# Patient Record
Sex: MALE | Race: BLACK OR AFRICAN AMERICAN | NOT HISPANIC OR LATINO | Employment: FULL TIME | URBAN - METROPOLITAN AREA
[De-identification: names, ages, dates, MRNs, and addresses within clinical notes are randomized per-mention and may not be internally consistent; named-entity substitution may affect disease eponyms.]

---

## 2023-10-27 ENCOUNTER — OFFICE VISIT (OUTPATIENT)
Dept: FAMILY MEDICINE CLINIC | Facility: CLINIC | Age: 38
End: 2023-10-27
Payer: COMMERCIAL

## 2023-10-27 ENCOUNTER — LAB (OUTPATIENT)
Dept: LAB | Facility: HOSPITAL | Age: 38
End: 2023-10-27
Payer: COMMERCIAL

## 2023-10-27 VITALS
HEIGHT: 71 IN | DIASTOLIC BLOOD PRESSURE: 90 MMHG | WEIGHT: 261.2 LBS | SYSTOLIC BLOOD PRESSURE: 132 MMHG | OXYGEN SATURATION: 97 % | HEART RATE: 93 BPM | BODY MASS INDEX: 36.57 KG/M2

## 2023-10-27 DIAGNOSIS — Z11.59 ENCOUNTER FOR HEPATITIS C SCREENING TEST FOR LOW RISK PATIENT: ICD-10-CM

## 2023-10-27 DIAGNOSIS — I10 PRIMARY HYPERTENSION: Primary | ICD-10-CM

## 2023-10-27 DIAGNOSIS — K21.9 GASTROESOPHAGEAL REFLUX DISEASE WITHOUT ESOPHAGITIS: ICD-10-CM

## 2023-10-27 DIAGNOSIS — Z23 IMMUNIZATION DUE: ICD-10-CM

## 2023-10-27 DIAGNOSIS — I10 PRIMARY HYPERTENSION: ICD-10-CM

## 2023-10-27 LAB
ALBUMIN SERPL-MCNC: 4.5 G/DL (ref 3.5–5.2)
ALBUMIN/GLOB SERPL: 1.5 G/DL
ALP SERPL-CCNC: 97 U/L (ref 39–117)
ALT SERPL W P-5'-P-CCNC: 25 U/L (ref 1–41)
ANION GAP SERPL CALCULATED.3IONS-SCNC: 10.4 MMOL/L (ref 5–15)
AST SERPL-CCNC: 31 U/L (ref 1–40)
BILIRUB SERPL-MCNC: 0.3 MG/DL (ref 0–1.2)
BUN SERPL-MCNC: 18 MG/DL (ref 6–20)
BUN/CREAT SERPL: 17.6 (ref 7–25)
CALCIUM SPEC-SCNC: 9.6 MG/DL (ref 8.6–10.5)
CHLORIDE SERPL-SCNC: 102 MMOL/L (ref 98–107)
CHOLEST SERPL-MCNC: 199 MG/DL (ref 0–200)
CO2 SERPL-SCNC: 25.6 MMOL/L (ref 22–29)
CREAT SERPL-MCNC: 1.02 MG/DL (ref 0.76–1.27)
DEPRECATED RDW RBC AUTO: 42.8 FL (ref 37–54)
EGFRCR SERPLBLD CKD-EPI 2021: 97.7 ML/MIN/1.73
ERYTHROCYTE [DISTWIDTH] IN BLOOD BY AUTOMATED COUNT: 12.5 % (ref 12.3–15.4)
GLOBULIN UR ELPH-MCNC: 3.1 GM/DL
GLUCOSE SERPL-MCNC: 96 MG/DL (ref 65–99)
HCT VFR BLD AUTO: 41.2 % (ref 37.5–51)
HCV AB SER DONR QL: NORMAL
HDLC SERPL-MCNC: 56 MG/DL (ref 40–60)
HGB BLD-MCNC: 14.2 G/DL (ref 13–17.7)
LDLC SERPL CALC-MCNC: 115 MG/DL (ref 0–100)
LDLC/HDLC SERPL: 1.98 {RATIO}
MCH RBC QN AUTO: 32.3 PG (ref 26.6–33)
MCHC RBC AUTO-ENTMCNC: 34.5 G/DL (ref 31.5–35.7)
MCV RBC AUTO: 93.6 FL (ref 79–97)
PLATELET # BLD AUTO: 343 10*3/MM3 (ref 140–450)
PMV BLD AUTO: 9.9 FL (ref 6–12)
POTASSIUM SERPL-SCNC: 4.1 MMOL/L (ref 3.5–5.2)
PROT SERPL-MCNC: 7.6 G/DL (ref 6–8.5)
RBC # BLD AUTO: 4.4 10*6/MM3 (ref 4.14–5.8)
SODIUM SERPL-SCNC: 138 MMOL/L (ref 136–145)
TRIGL SERPL-MCNC: 161 MG/DL (ref 0–150)
TSH SERPL DL<=0.05 MIU/L-ACNC: 1.39 UIU/ML (ref 0.27–4.2)
VLDLC SERPL-MCNC: 28 MG/DL (ref 5–40)
WBC NRBC COR # BLD: 5.74 10*3/MM3 (ref 3.4–10.8)

## 2023-10-27 PROCEDURE — 82607 VITAMIN B-12: CPT

## 2023-10-27 PROCEDURE — 86803 HEPATITIS C AB TEST: CPT

## 2023-10-27 PROCEDURE — 80061 LIPID PANEL: CPT

## 2023-10-27 PROCEDURE — 80050 GENERAL HEALTH PANEL: CPT

## 2023-10-27 RX ORDER — HYDROCHLOROTHIAZIDE 25 MG/1
25 TABLET ORAL DAILY
Qty: 90 TABLET | Refills: 3 | Status: SHIPPED | OUTPATIENT
Start: 2023-10-27

## 2023-10-27 RX ORDER — PANTOPRAZOLE SODIUM 40 MG/1
40 TABLET, DELAYED RELEASE ORAL DAILY
Qty: 90 TABLET | Refills: 3 | Status: SHIPPED | OUTPATIENT
Start: 2023-10-27

## 2023-10-27 RX ORDER — AMLODIPINE BESYLATE 10 MG/1
10 TABLET ORAL DAILY
Qty: 90 TABLET | Refills: 3 | Status: SHIPPED | OUTPATIENT
Start: 2023-10-27

## 2023-10-27 NOTE — PROGRESS NOTES
New Patient Office Visit      Date: 10/27/2023   Patient Name: Gavino Lamas  : 1985   MRN: [id number]     Chief Complaint:    Chief Complaint   Patient presents with    New patient preventative medicine services     Establish care    Med Refill     Pt needs refills on all of his medications.       History of Present Illness: Gavino Lamas is a 38 y.o. male who is here today to establish care.  He lives at home with his son.  He currently works for [employer] with the [employer].    Patient presents today to follow-up on chronic hypertension.  He is currently prescribed amlodipine and hydrochlorothiazide and is doing well with these medications.  Is been sometime since he had any screening lab work.  Patient also has a history of GERD.  He is currently taking Protonix and doing well with this medication.  Patient reports no strong family history of cancer.  He is interested in a flu vaccine today.      Subjective      Review of Systems:   Review of Systems   All other systems reviewed and are negative.      Past Medical History:   Past Medical History:   Diagnosis Date    Heartburn     Hypertension        Past Surgical History: History reviewed. No pertinent surgical history.    Family History: History reviewed. No pertinent family history.    Social History:   Social History     Socioeconomic History    Marital status: Single   Tobacco Use    Smoking status: Never    Smokeless tobacco: Never   Vaping Use    Vaping Use: Never used   Substance and Sexual Activity    Alcohol use: Yes    Drug use: Defer    Sexual activity: Defer       Medications:     Current Outpatient Medications:     amLODIPine (NORVASC) 10 MG tablet, Take 1 tablet by mouth Daily., Disp: 90 tablet, Rfl: 3    hydroCHLOROthiazide (HYDRODIURIL) 25 MG tablet, Take 1 tablet by mouth Daily., Disp: 90 tablet, Rfl: 3    pantoprazole (PROTONIX) 40 MG EC tablet, Take 1 tablet by mouth Daily., Disp: 90 tablet, Rfl: 3    Allergies:   Allergies  "  Allergen Reactions    Lisinopril Swelling       Objective     Physical Exam:  Vital Signs:   Vitals:    10/27/23 0759   BP: 132/90   Pulse: 93   SpO2: 97%   Weight: 118 kg (261 lb 3.2 oz)   Height: 180.3 cm (71\")     Body mass index is 36.43 kg/m².  Class 2 Severe Obesity (BMI >=35 and <=39.9). Obesity-related health conditions include the following: hypertension and GERD. Obesity is unchanged. BMI is is above average; BMI management plan is completed. We discussed low calorie, low carb based diet program, portion control, and increasing exercise.       Physical Exam  Vitals and nursing note reviewed.   Constitutional:       Appearance: Normal appearance. He is normal weight.   HENT:      Head: Normocephalic and atraumatic.      Nose: Nose normal.      Mouth/Throat:      Mouth: Mucous membranes are moist.   Eyes:      Extraocular Movements: Extraocular movements intact.      Pupils: Pupils are equal, round, and reactive to light.   Cardiovascular:      Rate and Rhythm: Normal rate and regular rhythm.   Pulmonary:      Effort: Pulmonary effort is normal.      Breath sounds: Normal breath sounds.   Abdominal:      General: Abdomen is flat. Bowel sounds are normal.      Palpations: Abdomen is soft.   Musculoskeletal:         General: Normal range of motion.      Cervical back: Normal range of motion.   Skin:     General: Skin is warm and dry.   Neurological:      General: No focal deficit present.      Mental Status: He is alert.   Psychiatric:         Mood and Affect: Mood normal.         Behavior: Behavior normal.         Thought Content: Thought content normal.         Judgment: Judgment normal.         Procedures    Results:   PHQ-9 Total Score: 0            Assessment / Plan      Assessment/Plan:   Diagnoses and all orders for this visit:    1. Primary hypertension (Primary)  Assessment & Plan:  Hypertension is improving with treatment.  Continue current treatment regimen.  Dietary sodium restriction.  Weight " loss.  Regular aerobic exercise.  Continue current medications.  Blood pressure will be reassessed at the next regular appointment.    Orders:  -     CBC (No Diff); Future  -     Comprehensive Metabolic Panel; Future  -     Lipid Panel; Future  -     TSH; Future  -     amLODIPine (NORVASC) 10 MG tablet; Take 1 tablet by mouth Daily.  Dispense: 90 tablet; Refill: 3  -     hydroCHLOROthiazide (HYDRODIURIL) 25 MG tablet; Take 1 tablet by mouth Daily.  Dispense: 90 tablet; Refill: 3    2. Immunization due  Assessment & Plan:  Flu vaccine given today    Orders:  -     Fluzone >6 Months (0307-5565)    3. Encounter for hepatitis C screening test for low risk patient  Assessment & Plan:  Screening labs completed today    Orders:  -     Hepatitis C antibody; Future    4. Gastroesophageal reflux disease without esophagitis  Assessment & Plan:  Continue PPI check labs today    Orders:  -     pantoprazole (PROTONIX) 40 MG EC tablet; Take 1 tablet by mouth Daily.  Dispense: 90 tablet; Refill: 3  -     Vitamin B12; Future         Follow Up:   Return in about 1 year (around 10/27/2024) for Annual.    Stephanie Garcia DO   [facility] [place] [facility]

## 2023-10-28 ENCOUNTER — TELEPHONE (OUTPATIENT)
Dept: FAMILY MEDICINE CLINIC | Facility: CLINIC | Age: 38
End: 2023-10-28
Payer: COMMERCIAL

## 2023-10-28 LAB — VIT B12 BLD-MCNC: 477 PG/ML (ref 211–946)

## 2023-10-28 NOTE — TELEPHONE ENCOUNTER
----- Message from Stephanie Garcia DO sent at 10/27/2023  3:50 PM EDT -----  I reviewed your lab results.  Your LDL was mildly elevated at 115 and your triglycerides were mildly elevated at 160. Your LDL cholesterol worsened, which if persistent would put you at increased risk for heart disease and stroke. To help reverse this trend, here are some recommendations: Engage in aerobic physical activity to lower blood pressure and reduce LDL and non-HDL cholesterol.  I would advise at least 3-4 sessions per week, lasting on average 40 minutes per session, involving moderate to vigorous intensity physical activity. 2.  For the benefit of both blood pressure and LDL lowering, consume a diet emphasizing the intake of vegetables, fruits, and whole grains.  It should include low-fat dairy products, poultry, fish, legumes, nontropical vegetable oils, and nuts.  Limit your intake of sweets, sugar sweetened beverages, and red meats.  I would repeat lipid panel in 1 year to ensure it is trending down.    Attempted to call no answer

## 2023-11-02 ENCOUNTER — PATIENT ROUNDING (BHMG ONLY) (OUTPATIENT)
Dept: FAMILY MEDICINE CLINIC | Facility: CLINIC | Age: 38
End: 2023-11-02
Payer: COMMERCIAL

## 2023-11-09 DIAGNOSIS — K21.9 GASTROESOPHAGEAL REFLUX DISEASE WITHOUT ESOPHAGITIS: ICD-10-CM

## 2023-11-09 RX ORDER — PANTOPRAZOLE SODIUM 40 MG/1
40 TABLET, DELAYED RELEASE ORAL DAILY
Qty: 90 TABLET | Refills: 3 | Status: SHIPPED | OUTPATIENT
Start: 2023-11-09

## 2023-11-09 NOTE — TELEPHONE ENCOUNTER
Rx Refill Note  Requested Prescriptions     Pending Prescriptions Disp Refills    pantoprazole (PROTONIX) 40 MG EC tablet 90 tablet 3     Sig: Take 1 tablet by mouth Daily.      Last office visit with prescribing clinician: 10/27/2023     Next office visit with prescribing clinician: 10/29/2024       Raven Jimenes MA  11/09/23, 10:43 EST

## 2024-03-28 ENCOUNTER — OFFICE VISIT (OUTPATIENT)
Dept: FAMILY MEDICINE CLINIC | Facility: CLINIC | Age: 39
End: 2024-03-28
Payer: COMMERCIAL

## 2024-03-28 VITALS
HEART RATE: 90 BPM | OXYGEN SATURATION: 98 % | WEIGHT: 275 LBS | HEIGHT: 71 IN | SYSTOLIC BLOOD PRESSURE: 136 MMHG | BODY MASS INDEX: 38.5 KG/M2 | DIASTOLIC BLOOD PRESSURE: 98 MMHG

## 2024-03-28 DIAGNOSIS — K21.9 GASTROESOPHAGEAL REFLUX DISEASE WITHOUT ESOPHAGITIS: ICD-10-CM

## 2024-03-28 DIAGNOSIS — I10 PRIMARY HYPERTENSION: ICD-10-CM

## 2024-03-28 DIAGNOSIS — J06.9 VIRAL URI WITH COUGH: ICD-10-CM

## 2024-03-28 DIAGNOSIS — E66.01 CLASS 2 SEVERE OBESITY DUE TO EXCESS CALORIES WITH SERIOUS COMORBIDITY AND BODY MASS INDEX (BMI) OF 38.0 TO 38.9 IN ADULT: Primary | ICD-10-CM

## 2024-03-28 PROBLEM — E66.812 CLASS 2 SEVERE OBESITY DUE TO EXCESS CALORIES WITH SERIOUS COMORBIDITY AND BODY MASS INDEX (BMI) OF 38.0 TO 38.9 IN ADULT: Status: ACTIVE | Noted: 2024-03-28

## 2024-03-28 LAB
EXPIRATION DATE: NORMAL
FLUAV AG UPPER RESP QL IA.RAPID: NOT DETECTED
FLUBV AG UPPER RESP QL IA.RAPID: NOT DETECTED
INTERNAL CONTROL: NORMAL
Lab: NORMAL
SARS-COV-2 AG UPPER RESP QL IA.RAPID: NOT DETECTED

## 2024-03-28 PROCEDURE — 87428 SARSCOV & INF VIR A&B AG IA: CPT | Performed by: FAMILY MEDICINE

## 2024-03-28 PROCEDURE — 99214 OFFICE O/P EST MOD 30 MIN: CPT | Performed by: FAMILY MEDICINE

## 2024-03-28 NOTE — ASSESSMENT & PLAN NOTE
Discussed the probable etiology of viral URI. Low suspicion for bacterial etiology. Recommended supportive care, adequate hydration, and stressed the importance of healthy hygiene habits to avoid spread (I.e. hand washing, social distancing) Advised patient to call clinic if they develop fever or if symptoms worsen/persist.

## 2024-03-28 NOTE — ASSESSMENT & PLAN NOTE
Hypertension is stable and controlled  Continue current treatment regimen.  Weight loss.  Regular aerobic exercise.  Ambulatory blood pressure monitoring.  Blood pressure will be reassessed in 3 months.

## 2024-03-28 NOTE — PROGRESS NOTES
Answers submitted by the patient for this visit:  Primary Reason for Visit (Submitted on 3/26/2024)  What is the primary reason for your visit?: Other  Other (Submitted on 3/26/2024)  Please describe your symptoms.: To discuss wegovy  Have you had these symptoms before?: No  How long have you been having these symptoms?: Greater than 2 weeks      Office Note     Name: Gavino Lamas    : 1985     MRN: [id number]     Chief Complaint  Weight Loss (Wants to discuss wegovy ), Nasal Congestion, and Cough (Symptoms have been ongoing for 2 weeks)    Subjective     History of Present Illness:  Gavino Lamas is a 38 y.o. male who presents today for evaluation of upper respiratory infection    Patient with today for evaluation of [place] infection.  He reports has been going on for 2 weeks.  He notes that he has nasal congestion the drain to his chest and causes a cough.  He denies any fevers or ear pain.    Patient has a diagnosis of hypertension and GERD and is currently well-controlled with medication.  Patient's blood pressure did improve on recheck.    Patient is also here today to discuss weight loss.  He reports over the past 12 months he has tried a variety of diet and exercise modifications with limited success.  He has not done calorie restriction, increased protein and increased fiber.  Patient managed in trying medication to help with weight loss.    Review of Systems:   Review of Systems   Constitutional:  Positive for unexpected weight gain.   All other systems reviewed and are negative.      Past Medical History:   Past Medical History:   Diagnosis Date    Heartburn     Hypertension        Past Surgical History: History reviewed. No pertinent surgical history.    Family History: History reviewed. No pertinent family history.    Social History:   Social History     Socioeconomic History    Marital status: Single   Tobacco Use    Smoking status: Former     Current packs/day: 0.00     Average packs/day:  "0.5 packs/day for 17.7 years (8.8 ttl pk-yrs)     Types: Cigars, Cigarettes     Start date: 2005     Quit date: 2023     Years since quittin.2    Smokeless tobacco: Never   Vaping Use    Vaping status: Never Used   Substance and Sexual Activity    Alcohol use: Yes     Comment: Socially/weekend    Drug use: Never    Sexual activity: Yes     Partners: Female     Birth control/protection: Condom       Immunizations:   Immunization History   Administered Date(s) Administered    31-influenza Vac Quardvalent Preservativ 2020    COVID-19 (MODERNA) 1st,2nd,3rd Dose Monovalent 2021, 2021    COVID-19 (MODERNA) Monovalent Original Booster 2021    Fluzone (or Fluarix & Flulaval for VFC) >6mos 2023, 10/27/2023    Hepatitis A 2023    Influenza Seasonal Injectable 2019    Tdap 2023        Medications:     Current Outpatient Medications:     amLODIPine (NORVASC) 10 MG tablet, Take 1 tablet by mouth Daily., Disp: 90 tablet, Rfl: 3    hydroCHLOROthiazide (HYDRODIURIL) 25 MG tablet, Take 1 tablet by mouth Daily., Disp: 90 tablet, Rfl: 3    pantoprazole (PROTONIX) 40 MG EC tablet, Take 1 tablet by mouth Daily., Disp: 90 tablet, Rfl: 3    Semaglutide-Weight Management 0.25 MG/0.5ML solution auto-injector, Inject 0.5 mL under the skin into the appropriate area as directed 1 (One) Time Per Week for 28 days., Disp: 2 mL, Rfl: 0    Allergies:   Allergies   Allergen Reactions    Lisinopril Swelling       Objective     Vital Signs  /98   Pulse 90   Ht 180.3 cm (70.98\")   Wt 125 kg (275 lb)   SpO2 98%   BMI 38.37 kg/m²   Estimated body mass index is 38.37 kg/m² as calculated from the following:    Height as of this encounter: 180.3 cm (70.98\").    Weight as of this encounter: 125 kg (275 lb).    Class 2 Severe Obesity (BMI >=35 and <=39.9). Obesity-related health conditions include the following: hypertension and GERD. Obesity is worsening. BMI is is above average; BMI " management plan is completed. We discussed portion control, increasing exercise, and pharmacologic options including Wegovy .       Physical Exam  Vitals and nursing note reviewed.   Constitutional:       Appearance: Normal appearance. He is obese.   HENT:      Head: Normocephalic and atraumatic.      Nose: Nose normal.      Mouth/Throat:      Mouth: Mucous membranes are moist.   Eyes:      Extraocular Movements: Extraocular movements intact.      Pupils: Pupils are equal, round, and reactive to light.   Cardiovascular:      Rate and Rhythm: Normal rate and regular rhythm.   Pulmonary:      Effort: Pulmonary effort is normal.      Breath sounds: Normal breath sounds.   Abdominal:      General: Abdomen is flat. Bowel sounds are normal.      Palpations: Abdomen is soft.   Musculoskeletal:         General: Normal range of motion.      Cervical back: Normal range of motion.   Skin:     General: Skin is warm and dry.   Neurological:      General: No focal deficit present.      Mental Status: He is alert.   Psychiatric:         Mood and Affect: Mood normal.         Behavior: Behavior normal.         Thought Content: Thought content normal.         Judgment: Judgment normal.            Procedures     Results:  Recent Results (from the past 24 hour(s))   POCT SARS-CoV-2 Antigen MARGO + Flu    Collection Time: 03/28/24  4:28 PM    Specimen: Swab   Result Value Ref Range    SARS Antigen Not Detected Not Detected, Presumptive Negative    Influenza A Antigen MARGO Not Detected Not Detected    Influenza B Antigen MARGO Not Detected Not Detected    Internal Control Passed Passed    Lot Number [id number]     Expiration Date 02/05/2025         Assessment and Plan     Assessment/Plan:  Diagnoses and all orders for this visit:    1. Class 2 severe obesity due to excess calories with serious comorbidity and body mass index (BMI) of 38.0 to 38.9 in adult (Primary)  Assessment & Plan:  Patient's (Body mass index is 38.37 kg/m².) indicates that  they are morbidly/severely obese (BMI > 40 or > 35 with obesity - related health condition) with health conditions that include hypertension and GERD . Weight is worsening. BMI  is above average; BMI management plan is completed. We discussed portion control, increasing exercise, pharmacologic options including Wegovy, and Information on healthy weight added to patient's after visit summary.     Orders:  -     POCT SARS-CoV-2 Antigen MARGO + Flu  -     Semaglutide-Weight Management 0.25 MG/0.5ML solution auto-injector; Inject 0.5 mL under the skin into the appropriate area as directed 1 (One) Time Per Week for 28 days.  Dispense: 2 mL; Refill: 0    2. Primary hypertension  Assessment & Plan:  Hypertension is stable and controlled  Continue current treatment regimen.  Weight loss.  Regular aerobic exercise.  Ambulatory blood pressure monitoring.  Blood pressure will be reassessed in 3 months.      3. Gastroesophageal reflux disease without esophagitis  Assessment & Plan:  Continue PPI      4. Viral URI with cough  Assessment & Plan:  Discussed the probable etiology of viral URI. Low suspicion for bacterial etiology. Recommended supportive care, adequate hydration, and stressed the importance of healthy hygiene habits to avoid spread (I.e. hand washing, social distancing) Advised patient to call clinic if they develop fever or if symptoms worsen/persist.            Follow Up  Return in about 3 months (around 6/28/2024) for Recheck.    Stephanie Garcia DO   [facility]

## 2024-03-28 NOTE — ASSESSMENT & PLAN NOTE
Patient's (Body mass index is 38.37 kg/m².) indicates that they are morbidly/severely obese (BMI > 40 or > 35 with obesity - related health condition) with health conditions that include hypertension and GERD . Weight is worsening. BMI  is above average; BMI management plan is completed. We discussed portion control, increasing exercise, pharmacologic options including Wegovy, and Information on healthy weight added to patient's after visit summary.

## 2024-04-15 DIAGNOSIS — E66.01 CLASS 2 SEVERE OBESITY DUE TO EXCESS CALORIES WITH SERIOUS COMORBIDITY AND BODY MASS INDEX (BMI) OF 38.0 TO 38.9 IN ADULT: ICD-10-CM

## 2024-04-17 RX ORDER — SEMAGLUTIDE 0.25 MG/.5ML
INJECTION, SOLUTION SUBCUTANEOUS
Qty: 2 ML | Refills: 0 | Status: SHIPPED | OUTPATIENT
Start: 2024-04-17

## 2024-05-09 DIAGNOSIS — E66.01 CLASS 2 SEVERE OBESITY DUE TO EXCESS CALORIES WITH SERIOUS COMORBIDITY AND BODY MASS INDEX (BMI) OF 38.0 TO 38.9 IN ADULT: ICD-10-CM

## 2024-05-09 RX ORDER — SEMAGLUTIDE 0.25 MG/.5ML
INJECTION, SOLUTION SUBCUTANEOUS
Qty: 2 ML | Refills: 0 | Status: SHIPPED | OUTPATIENT
Start: 2024-05-09 | End: 2024-05-09

## 2024-05-10 ENCOUNTER — PRIOR AUTHORIZATION (OUTPATIENT)
Dept: FAMILY MEDICINE CLINIC | Facility: CLINIC | Age: 39
End: 2024-05-10
Payer: COMMERCIAL

## 2024-10-29 ENCOUNTER — OFFICE VISIT (OUTPATIENT)
Dept: FAMILY MEDICINE CLINIC | Facility: CLINIC | Age: 39
End: 2024-10-29
Payer: COMMERCIAL

## 2024-10-29 VITALS
HEIGHT: 71 IN | TEMPERATURE: 98 F | BODY MASS INDEX: 37.77 KG/M2 | SYSTOLIC BLOOD PRESSURE: 154 MMHG | OXYGEN SATURATION: 95 % | HEART RATE: 94 BPM | WEIGHT: 269.8 LBS | DIASTOLIC BLOOD PRESSURE: 109 MMHG

## 2024-10-29 DIAGNOSIS — E66.01 CLASS 2 SEVERE OBESITY DUE TO EXCESS CALORIES WITH SERIOUS COMORBIDITY AND BODY MASS INDEX (BMI) OF 37.0 TO 37.9 IN ADULT: ICD-10-CM

## 2024-10-29 DIAGNOSIS — J40 BRONCHITIS: Primary | ICD-10-CM

## 2024-10-29 DIAGNOSIS — K21.9 GASTROESOPHAGEAL REFLUX DISEASE WITHOUT ESOPHAGITIS: ICD-10-CM

## 2024-10-29 DIAGNOSIS — E66.812 CLASS 2 SEVERE OBESITY DUE TO EXCESS CALORIES WITH SERIOUS COMORBIDITY AND BODY MASS INDEX (BMI) OF 37.0 TO 37.9 IN ADULT: ICD-10-CM

## 2024-10-29 DIAGNOSIS — I10 PRIMARY HYPERTENSION: ICD-10-CM

## 2024-10-29 LAB
EXPIRATION DATE: NORMAL
EXPIRATION DATE: NORMAL
FLUAV AG UPPER RESP QL IA.RAPID: NOT DETECTED
FLUBV AG UPPER RESP QL IA.RAPID: NOT DETECTED
INTERNAL CONTROL: NORMAL
INTERNAL CONTROL: NORMAL
Lab: NORMAL
Lab: NORMAL
S PYO AG THROAT QL: NEGATIVE
SARS-COV-2 AG UPPER RESP QL IA.RAPID: NOT DETECTED

## 2024-10-29 PROCEDURE — 87880 STREP A ASSAY W/OPTIC: CPT | Performed by: FAMILY MEDICINE

## 2024-10-29 PROCEDURE — 99214 OFFICE O/P EST MOD 30 MIN: CPT | Performed by: FAMILY MEDICINE

## 2024-10-29 PROCEDURE — 87428 SARSCOV & INF VIR A&B AG IA: CPT | Performed by: FAMILY MEDICINE

## 2024-10-29 RX ORDER — PANTOPRAZOLE SODIUM 40 MG/1
40 TABLET, DELAYED RELEASE ORAL DAILY
Qty: 90 TABLET | Refills: 3 | Status: SHIPPED | OUTPATIENT
Start: 2024-10-29

## 2024-10-29 RX ORDER — AZITHROMYCIN 250 MG/1
TABLET, FILM COATED ORAL
Qty: 6 TABLET | Refills: 0 | Status: SHIPPED | OUTPATIENT
Start: 2024-10-29 | End: 2024-11-02

## 2024-10-29 RX ORDER — HYDROCHLOROTHIAZIDE 25 MG/1
25 TABLET ORAL DAILY
Qty: 90 TABLET | Refills: 3 | Status: SHIPPED | OUTPATIENT
Start: 2024-10-29

## 2024-10-29 RX ORDER — AMLODIPINE BESYLATE 10 MG/1
10 TABLET ORAL DAILY
Qty: 90 TABLET | Refills: 3 | Status: SHIPPED | OUTPATIENT
Start: 2024-10-29

## 2024-10-29 NOTE — PROGRESS NOTES
Office Note     Name: Gavino Lamas    : 1985     MRN: [id number]     Chief Complaint  Cough (All symptoms ongoing for a week ), Nasal Congestion, and Sore Throat    Subjective     History of Present Illness:  Gavino Lamas is a 39 y.o. male who presents today for URI.    Patient has the past week he has been having a cough, nasal congestion, and sore throat.  He is little hoarse.  He denies any fevers or GI symptoms.  He denies any sick contacts at home.  He notes he has been using Mucinex and cough drops over-the-counter with minimal relief.    Patient is also requesting a refill on his maintenance medications.  He is taking hydrochlorothiazide LMP and and doing well with this regimen.  He is also taking PPI for GERD and doing well.      Review of Systems:   Review of Systems   HENT:  Positive for congestion.    Respiratory:  Positive for cough.    All other systems reviewed and are negative.      Past Medical History:   Past Medical History:   Diagnosis Date    Heartburn     Hypertension        Past Surgical History: History reviewed. No pertinent surgical history.    Family History: History reviewed. No pertinent family history.    Social History:   Social History     Socioeconomic History    Marital status: Single   Tobacco Use    Smoking status: Former     Current packs/day: 0.00     Average packs/day: 0.5 packs/day for 17.7 years (8.8 ttl pk-yrs)     Types: Cigars, Cigarettes     Start date: 2005     Quit date: 2023     Years since quittin.8    Smokeless tobacco: Never   Vaping Use    Vaping status: Never Used   Substance and Sexual Activity    Alcohol use: Yes     Comment: Socially/weekend    Drug use: Never    Sexual activity: Yes     Partners: Female     Birth control/protection: Condom       Immunizations:   Immunization History   Administered Date(s) Administered    31-influenza Vac Quardvalent Preservativ 2020    COVID-19 (MODERNA) 1st,2nd,3rd Dose Monovalent 2021,  "05/21/2021    COVID-19 (MODERNA) Monovalent Original Booster 12/23/2021    Fluzone (or Fluarix & Flulaval for VFC) >6mos 01/03/2023, 10/27/2023    Hepatitis A 01/03/2023    Influenza Seasonal Injectable 11/05/2019    Tdap 01/03/2023        Medications:     Current Outpatient Medications:     amLODIPine (NORVASC) 10 MG tablet, Take 1 tablet by mouth Daily., Disp: 90 tablet, Rfl: 3    hydroCHLOROthiazide 25 MG tablet, Take 1 tablet by mouth Daily., Disp: 90 tablet, Rfl: 3    pantoprazole (PROTONIX) 40 MG EC tablet, Take 1 tablet by mouth Daily., Disp: 90 tablet, Rfl: 3    azithromycin (Zithromax Z-Elvis) 250 MG tablet, Take 2 tablets by mouth Daily for 1 day, THEN 1 tablet Daily for 4 days. Take 2 tablets the first day, then 1 tablet daily for 4 days., Disp: 6 tablet, Rfl: 0    Allergies:   Allergies   Allergen Reactions    Lisinopril Swelling       Objective     Vital Signs  BP (!) 154/109   Pulse 94   Temp 98 °F (36.7 °C)   Ht 180.3 cm (70.98\")   Wt 122 kg (269 lb 12.8 oz)   SpO2 95%   BMI 37.65 kg/m²   Estimated body mass index is 37.65 kg/m² as calculated from the following:    Height as of this encounter: 180.3 cm (70.98\").    Weight as of this encounter: 122 kg (269 lb 12.8 oz).       Physical Exam  Vitals and nursing note reviewed.   Constitutional:       Appearance: Normal appearance. He is obese.   HENT:      Head: Normocephalic and atraumatic.      Nose: Nose normal.      Mouth/Throat:      Mouth: Mucous membranes are moist.   Eyes:      Extraocular Movements: Extraocular movements intact.      Pupils: Pupils are equal, round, and reactive to light.   Cardiovascular:      Rate and Rhythm: Normal rate.   Pulmonary:      Effort: Pulmonary effort is normal.   Abdominal:      General: Abdomen is flat.   Musculoskeletal:         General: Normal range of motion.      Cervical back: Normal range of motion.   Skin:     General: Skin is warm and dry.   Neurological:      General: No focal deficit present.      " Mental Status: He is alert.   Psychiatric:         Mood and Affect: Mood normal.         Behavior: Behavior normal.         Thought Content: Thought content normal.         Judgment: Judgment normal.            Procedures     Results:  Recent Results (from the past 24 hours)   POCT SARS-CoV-2 Antigen MARGO + Flu    Collection Time: 10/29/24  9:19 AM    Specimen: Swab   Result Value Ref Range    SARS Antigen Not Detected Not Detected, Presumptive Negative    Influenza A Antigen MARGO Not Detected Not Detected    Influenza B Antigen MARGO Not Detected Not Detected    Internal Control Passed Passed    Lot Number [id number]     Expiration Date 9/4/2025    POCT rapid strep A    Collection Time: 10/29/24  9:20 AM    Specimen: Swab   Result Value Ref Range    Rapid Strep A Screen Negative Negative, VALID, INVALID, Not Performed    Internal Control Passed Passed    Lot Number [id number]     Expiration Date 07/22/2025         Assessment and Plan     Assessment/Plan:  Diagnoses and all orders for this visit:    1. Bronchitis (Primary)  Assessment & Plan:  Will start azithromycin. Recommended supportive care, adequate hydration, and stressed the importance of healthy hygiene habits to avoid spread (I.e. hand washing, social distancing) Advised patient to call clinic if they develop fever or if symptoms worsen/persist.       Orders:  -     POCT SARS-CoV-2 Antigen MARGO + Flu  -     POCT rapid strep A  -     azithromycin (Zithromax Z-Elvis) 250 MG tablet; Take 2 tablets by mouth Daily for 1 day, THEN 1 tablet Daily for 4 days. Take 2 tablets the first day, then 1 tablet daily for 4 days.  Dispense: 6 tablet; Refill: 0    2. Class 2 severe obesity due to excess calories with serious comorbidity and body mass index (BMI) of 37.0 to 37.9 in adult  Assessment & Plan:  Patient's (Body mass index is 37.65 kg/m².) indicates that they are morbidly/severely obese (BMI > 40 or > 35 with obesity - related health condition) with health conditions that  include hypertension and GERD . Weight is improving with lifestyle modifications. BMI  is above average; BMI management plan is completed. We discussed portion control, increasing exercise, and Information on healthy weight added to patient's after visit summary.       3. Primary hypertension  Assessment & Plan:  Hypertension is stable and controlled  Continue current treatment regimen.  Dietary sodium restriction.  Weight loss.  Regular aerobic exercise.  Blood pressure will be reassessed in 6 months.    Orders:  -     amLODIPine (NORVASC) 10 MG tablet; Take 1 tablet by mouth Daily.  Dispense: 90 tablet; Refill: 3  -     hydroCHLOROthiazide 25 MG tablet; Take 1 tablet by mouth Daily.  Dispense: 90 tablet; Refill: 3  -     CBC (No Diff); Future  -     Lipid Panel; Future  -     Comprehensive Metabolic Panel; Future    4. Gastroesophageal reflux disease without esophagitis  -     pantoprazole (PROTONIX) 40 MG EC tablet; Take 1 tablet by mouth Daily.  Dispense: 90 tablet; Refill: 3    Other orders  -     Cancel: Fluzone >6mos ([id number])        Follow Up  Return in about 1 year (around 10/29/2025) for Annual physical.    Stephanie Garcia DO   [facility]

## 2024-10-29 NOTE — ASSESSMENT & PLAN NOTE
Patient's (Body mass index is 37.65 kg/m².) indicates that they are morbidly/severely obese (BMI > 40 or > 35 with obesity - related health condition) with health conditions that include hypertension and GERD . Weight is improving with lifestyle modifications. BMI  is above average; BMI management plan is completed. We discussed portion control, increasing exercise, and Information on healthy weight added to patient's after visit summary.

## 2024-10-29 NOTE — ASSESSMENT & PLAN NOTE
Will start azithromycin. Recommended supportive care, adequate hydration, and stressed the importance of healthy hygiene habits to avoid spread (I.e. hand washing, social distancing) Advised patient to call clinic if they develop fever or if symptoms worsen/persist.

## 2024-11-20 DIAGNOSIS — K21.9 GASTROESOPHAGEAL REFLUX DISEASE WITHOUT ESOPHAGITIS: ICD-10-CM

## 2024-11-20 RX ORDER — PANTOPRAZOLE SODIUM 40 MG/1
40 TABLET, DELAYED RELEASE ORAL DAILY
Qty: 90 TABLET | Refills: 3 | OUTPATIENT
Start: 2024-11-20

## 2025-01-03 DIAGNOSIS — I10 PRIMARY HYPERTENSION: ICD-10-CM

## 2025-01-03 DIAGNOSIS — K21.9 GASTROESOPHAGEAL REFLUX DISEASE WITHOUT ESOPHAGITIS: ICD-10-CM

## 2025-01-03 RX ORDER — AMLODIPINE BESYLATE 10 MG/1
10 TABLET ORAL DAILY
Qty: 90 TABLET | Refills: 3 | OUTPATIENT
Start: 2025-01-03

## 2025-01-03 RX ORDER — PANTOPRAZOLE SODIUM 40 MG/1
40 TABLET, DELAYED RELEASE ORAL DAILY
Qty: 90 TABLET | Refills: 3 | OUTPATIENT
Start: 2025-01-03

## 2025-01-03 RX ORDER — HYDROCHLOROTHIAZIDE 25 MG/1
25 TABLET ORAL DAILY
Qty: 90 TABLET | Refills: 3 | OUTPATIENT
Start: 2025-01-03

## 2025-02-10 ENCOUNTER — PATIENT MESSAGE (OUTPATIENT)
Dept: FAMILY MEDICINE CLINIC | Facility: CLINIC | Age: 40
End: 2025-02-10
Payer: COMMERCIAL

## 2025-02-10 DIAGNOSIS — J10.1 INFLUENZA A: Primary | ICD-10-CM

## 2025-02-10 RX ORDER — OSELTAMIVIR PHOSPHATE 75 MG/1
75 CAPSULE ORAL 2 TIMES DAILY
Qty: 10 CAPSULE | Refills: 0 | Status: SHIPPED | OUTPATIENT
Start: 2025-02-10 | End: 2025-02-15